# Patient Record
Sex: FEMALE | Race: WHITE | NOT HISPANIC OR LATINO
[De-identification: names, ages, dates, MRNs, and addresses within clinical notes are randomized per-mention and may not be internally consistent; named-entity substitution may affect disease eponyms.]

---

## 2022-01-25 PROBLEM — Z00.00 ENCOUNTER FOR PREVENTIVE HEALTH EXAMINATION: Status: ACTIVE | Noted: 2022-01-25

## 2022-01-27 ENCOUNTER — APPOINTMENT (OUTPATIENT)
Dept: NEUROLOGY | Facility: CLINIC | Age: 27
End: 2022-01-27
Payer: COMMERCIAL

## 2022-01-27 ENCOUNTER — NON-APPOINTMENT (OUTPATIENT)
Age: 27
End: 2022-01-27

## 2022-01-27 VITALS
TEMPERATURE: 98.3 F | SYSTOLIC BLOOD PRESSURE: 118 MMHG | DIASTOLIC BLOOD PRESSURE: 74 MMHG | HEIGHT: 68 IN | WEIGHT: 187 LBS | BODY MASS INDEX: 28.34 KG/M2 | HEART RATE: 66 BPM | OXYGEN SATURATION: 97 %

## 2022-01-27 DIAGNOSIS — R25.1 TREMOR, UNSPECIFIED: ICD-10-CM

## 2022-01-27 PROCEDURE — 99204 OFFICE O/P NEW MOD 45 MIN: CPT

## 2022-01-27 NOTE — PHYSICAL EXAM
[FreeTextEntry1] : Alert.  Fully oriented.  Speech and language are intact.  Cranial nerves II-XII are intact.  Motor exam reveals intact strength with individual muscle testing in bilateral upper and lower extremities.  Tone is normal.  Reflexes are normal.  Toes are downgoing.  Sensation is intact to light touch in distal extremities.  Finger-to-nose and heel-to-shin are intact. Slight intension/postural tremor with outstretched arms and FTN. No tremor at rest. Negative pull test. Normal rapid alternating movements are normal in the upper and lower extremities.  Gait is normal. She is able to walk on heels, toes, and in tandem without difficulty. Romberg negative. See scanned spiral/writing example.\par

## 2022-01-27 NOTE — HISTORY OF PRESENT ILLNESS
[FreeTextEntry1] : The patient is a very pleasant right-handed 26-year-old female with a history of Covid infection with full recovery in 2020 and hyperlipidemia, now better controlled who presents for evaluation of bilateral hand tremor.\par \par The patient states around 2 months ago, she developed tremor in both of her hands, the right worse than the left.  It seems to increase throughout the day.  It is not worsened or improved with caffeine but does worsen with alcohol and exercise.  She has not noticed any particular effect of eating food. She denies taking any medications apart from occasional Advil.  She notices most while typing, drinking, or doing goal-directed activities.  She has had ongoing fatigue and some dry mouth but denies any other systemic symptoms.  She has had a 60 pound intentional weight loss to improve diet and exercise.  She reports head trauma in the past with 2 prior concussions while playing sports.  She has occasional headaches but no history of migraines.  The patient denies substance use or tobacco use but does drink 4 glasses of wine at least 3-4 times per week.  She has a history of essential tremor in her grandfather but he was diagnosed at an older age.\par \par THe patient was seen by her PCP for the tremor. TSH, CMP, CBC, and Lipid panel were all unremarkable. B12 was 400. She has had no other studies.

## 2022-01-27 NOTE — ASSESSMENT
[FreeTextEntry1] : The patient is a very pleasant 26-year-old female who presents for bilateral hand tremor, right greater than left. Exam shows a relatively fine, low amplitude tremor with both postural and intention components. We will obtain basic labs and MRI brain to start. I will see her back to review symptomsm and will also place a movement disorder referral if needed.

## 2022-01-28 LAB
CRP SERPL-MCNC: <3 MG/L
ERYTHROCYTE [SEDIMENTATION RATE] IN BLOOD BY WESTERGREN METHOD: 2 MM/HR
FOLATE SERPL-MCNC: 13.4 NG/ML
T PALLIDUM AB SER QL IA: NEGATIVE
VIT B12 SERPL-MCNC: 513 PG/ML

## 2022-01-29 LAB
CERULOPLASMIN SERPL-MCNC: 24 MG/DL
ENA RNP AB SER IA-ACNC: 0.2 AL
ENA SM AB SER IA-ACNC: <0.2 AL

## 2022-01-31 LAB
HOMOCYSTEINE LEVEL: 10.3 UMOL/L
METHYLMALONIC ACID LEVEL: 97 NMOL/L

## 2022-02-02 LAB — ANA SER IF-ACNC: NEGATIVE

## 2022-02-04 ENCOUNTER — RESULT REVIEW (OUTPATIENT)
Age: 27
End: 2022-02-04

## 2022-02-04 ENCOUNTER — OUTPATIENT (OUTPATIENT)
Dept: OUTPATIENT SERVICES | Facility: HOSPITAL | Age: 27
LOS: 1 days | End: 2022-02-04

## 2022-02-04 ENCOUNTER — NON-APPOINTMENT (OUTPATIENT)
Age: 27
End: 2022-02-04

## 2022-02-04 ENCOUNTER — APPOINTMENT (OUTPATIENT)
Dept: MRI IMAGING | Facility: CLINIC | Age: 27
End: 2022-02-04
Payer: COMMERCIAL

## 2022-02-04 PROCEDURE — 70551 MRI BRAIN STEM W/O DYE: CPT | Mod: 26

## 2022-02-28 ENCOUNTER — APPOINTMENT (OUTPATIENT)
Dept: NEUROLOGY | Facility: CLINIC | Age: 27
End: 2022-02-28
Payer: COMMERCIAL

## 2022-02-28 ENCOUNTER — APPOINTMENT (OUTPATIENT)
Dept: NEUROLOGY | Facility: CLINIC | Age: 27
End: 2022-02-28

## 2022-02-28 VITALS
OXYGEN SATURATION: 97 % | DIASTOLIC BLOOD PRESSURE: 76 MMHG | TEMPERATURE: 97.2 F | HEIGHT: 68 IN | WEIGHT: 202 LBS | HEART RATE: 74 BPM | BODY MASS INDEX: 30.62 KG/M2 | SYSTOLIC BLOOD PRESSURE: 115 MMHG

## 2022-02-28 DIAGNOSIS — G47.00 INSOMNIA, UNSPECIFIED: ICD-10-CM

## 2022-02-28 DIAGNOSIS — G93.5 COMPRESSION OF BRAIN: ICD-10-CM

## 2022-02-28 PROCEDURE — 99214 OFFICE O/P EST MOD 30 MIN: CPT

## 2022-02-28 RX ORDER — CHOLECALCIFEROL (VITAMIN D3) 25 MCG
3 TABLET ORAL
Qty: 60 | Refills: 0 | Status: ACTIVE | COMMUNITY
Start: 2022-02-28 | End: 1900-01-01

## 2022-03-01 NOTE — HISTORY OF PRESENT ILLNESS
[FreeTextEntry1] : The patient is a very pleasant right-handed 26-year-old female with a history of Covid infection with full recovery in 2020 and hyperlipidemia, now better controlled who presents for f/u of bilateral hand tremor.\par \par From visit with Dr. Yarbrough in January- The patient states around 2 months ago, she developed tremor in both of her hands, the right worse than the left. It seems to increase throughout the day. It is not worsened or improved with caffeine but does worsen with alcohol and exercise. She has not noticed any particular effect of eating food. She denies taking any medications apart from occasional Advil. She notices most while typing, drinking, or doing goal-directed activities. She has had ongoing fatigue and some dry mouth but denies any other systemic symptoms. She has had a 60 pound intentional weight loss to improve diet and exercise. She reports head trauma in the past with 2 prior concussions while playing sports. She has occasional headaches but no history of migraines. The patient denies substance use or tobacco use but does drink 4 glasses of wine at least 3-4 times per week. She has a history of essential tremor in her grandfather but he was diagnosed at an older age. The patient was seen by her PCP for the tremor. TSH, CMP, CBC, and Lipid panel were all unremarkable. B12 was 400. She has had no other studies. \par ____________________________________\par \par 2/28\par MRI- chiari I with no acute or other findings to explain tremor \par \par Discussed tremors more at length today, She says her tremors initially began about 3 months ago. They appear to be getting worse since her visit with Dr. Yarbrough at the end of January. Says that she has been having more anxiety related to her tremors since that visit, which in turn is affecting her sleep. Says that most nights she wakes up at about 2am and tosses and turns the rest of the night. \par She endorses continued frequent ETOH use. She says she has 7-8 drinks per day on weekends, and then 1-2 drinks one weeknight a week. She says her tremors stop abruptly when she drinks alcohol. She denies hx of any withdrawal symptoms including palpitations/ nausea/ palpitations/ headaches/ mood changes/ seizures. She denies other recreational drug use. On the whole her tremors are worse during the week when she is not drinking as much. She does not take anything for anxiety. She does not take any medication for sleep. \par \par \par \par

## 2022-03-01 NOTE — ASSESSMENT
[FreeTextEntry1] : Pt here for f/u of b/l tremor. Initial workup including BW, MRI noncontributing although there is incidental finding of chiari I which should be monitored (will order repeat MRI to be done in 1 year). Af of now, her hx and exam are consistent with an essential tremor which are made worse by stress, lack of sleep, and large volume ETOH intake. Discussed limiting ETOH slowly and safely, can refer to ETOH cessation program as needed... Will also send rx for melatonin XR to help improve sleep. Discussed options for treatment including propanolol. She wishes to first improve sleep and ETOH habits and then discuss if these do not help... She has upcoming appt with movement d/o team for further eval.

## 2022-03-01 NOTE — PHYSICAL EXAM
[FreeTextEntry1] : The patient is alert and oriented x3, naming intact x3, repetition normal, follows three-step commands, and is able to participate fully in the history taking.\par Speech is normal with no evidence of dysarthria.\par Memory is intact: Immediate recall 3 out of 3, short-term 3 out of 3, remote memory intact\par Cranial nerves II through XII intact\par Motor exam: Upper and lower extremities 5 out of 5 power, normal tone. No tremor at rest. Fine tremor noted with purposeful movements on FNF, R>L. Mild tremor noted of tongue.  \par Sensory exam: Intact to light touch and pinprick. Romberg negative.\par Coordination and vestibular exam: Finger to nose intact, no evidence of truncal or appendicular ataxia. No evidence of nystagmus. no vestibular symptoms elicited with head turning during ambulation.\par Gait: Normal stance and gait.\par Reflexes: One to 2+ in upper and lower extremities. No pathological reflexes. Downgoing toes.\par

## 2022-04-29 ENCOUNTER — APPOINTMENT (OUTPATIENT)
Dept: NEUROLOGY | Facility: CLINIC | Age: 27
End: 2022-04-29

## 2022-08-26 ENCOUNTER — APPOINTMENT (OUTPATIENT)
Dept: NEUROLOGY | Facility: CLINIC | Age: 27
End: 2022-08-26

## 2022-08-26 VITALS
SYSTOLIC BLOOD PRESSURE: 126 MMHG | TEMPERATURE: 97.9 F | HEIGHT: 68 IN | DIASTOLIC BLOOD PRESSURE: 81 MMHG | HEART RATE: 70 BPM | WEIGHT: 194 LBS | BODY MASS INDEX: 29.4 KG/M2 | OXYGEN SATURATION: 98 %

## 2022-08-26 VITALS — SYSTOLIC BLOOD PRESSURE: 111 MMHG | HEART RATE: 52 BPM | OXYGEN SATURATION: 98 % | DIASTOLIC BLOOD PRESSURE: 76 MMHG

## 2022-08-26 PROCEDURE — 99215 OFFICE O/P EST HI 40 MIN: CPT

## 2022-08-29 NOTE — DISCUSSION/SUMMARY
[FreeTextEntry1] : Ms. Coats is a 28yo female presenting to movement clinic for bilateral hand tremors which started 2020-21 after the pandemic in the setting of binge drinking 8-12 drinks on the weekend, first in the right then progressive to the left. TSH, B12, and other serum tests WNL. Combination of exam with postural tremor along with clinical history of tremor starting after etoh use for 3years is highly suggestive of etoh induced tremor. Less likely to be essential tremor given lack of family hx and no improvement with etoh. Chiari malformation is likely an incidental finding but along with the drinking may have caused this tremor.\par \par - Discussed to remain off etoh as alcohol may be a causing or contributing factor to tremor.  \par - Cannot use propranolol given hx of asthma\par - Primidone is usually more effective in ET than etoh induced tremor. \par - Can try primidone 25mg nightly and titrate by half to BID then goal of 50mg BID\par \par \par Case discussed and pt examined with movement attending Dr. Collins\par \par Aden Mendez MD\par Movement Fellow

## 2022-08-29 NOTE — PHYSICAL EXAM
[FreeTextEntry1] : Movement Exam\par \par EOMI\par No rigidity of limbs, bradykinesia, or other parkinsonian signs\par No resting tremor\par instant tremor with hands outstretch, postural bilateral R>L slight\par No dysmetria with finger to nose\par Gait: able to stand with hands crossed and without assistance\par normal gait \par \par Hand spirals showed axis with left hand and less on right

## 2022-08-29 NOTE — HISTORY OF PRESENT ILLNESS
[FreeTextEntry1] : Ms. Coats is a 28yo female presenting to movement clinic for bilateral hand tremors which started 2020 first in the right then the left. Now progressively worse. She reports struggling placing jewelry and lifting a drink, typing, and work. Eating food is not affected. Present 70% of the day. Things that worsen the tremor include lack of sleep and being tired.  She used to evgeny drink on the weekend 8-12 drinks since the pandemic started 2-3 years ago. She has seen general neurology who recommended to wean off etoh which she has and been sober for 2-3 weeks. No improvement after drinking. some worsening day after a binge drink.  No tremor at rest. No family hx of tremor. no other medical conditions sparingly uses advil. Had childhood asthma. no supplements. Drinks 1 cup of coffee. If has 3-4cups can increase the tremor. \par \par Denies any trouble walking\par Sleep poor at times. 10pm till 6:30am. tosses and turns. no vivid dreams except if she eats excess dreams. denies talking or moving in sleep. melatonin has helped\par sense of smell good\par No constipation. twice a day bowel movement. \par penmanship has worsened worse with being tired. \par \par \par Construction desk job with typing\par no family hx of any neuro conditions\par \par No meds

## 2022-10-19 ENCOUNTER — APPOINTMENT (OUTPATIENT)
Dept: NEUROLOGY | Facility: CLINIC | Age: 27
End: 2022-10-19

## 2022-10-19 VITALS
TEMPERATURE: 97.2 F | HEART RATE: 67 BPM | OXYGEN SATURATION: 98 % | WEIGHT: 202 LBS | DIASTOLIC BLOOD PRESSURE: 85 MMHG | SYSTOLIC BLOOD PRESSURE: 131 MMHG | HEIGHT: 68 IN | BODY MASS INDEX: 30.62 KG/M2

## 2022-10-19 VITALS — DIASTOLIC BLOOD PRESSURE: 72 MMHG | OXYGEN SATURATION: 98 % | HEART RATE: 99 BPM | SYSTOLIC BLOOD PRESSURE: 109 MMHG

## 2022-10-19 PROCEDURE — 99214 OFFICE O/P EST MOD 30 MIN: CPT

## 2022-10-19 RX ORDER — PRIMIDONE 50 MG/1
50 TABLET ORAL
Qty: 90 | Refills: 2 | Status: DISCONTINUED | COMMUNITY
Start: 2022-08-26 | End: 2022-10-19

## 2023-10-27 ENCOUNTER — APPOINTMENT (OUTPATIENT)
Dept: NEUROLOGY | Facility: CLINIC | Age: 28
End: 2023-10-27
Payer: COMMERCIAL

## 2023-10-27 VITALS
HEIGHT: 68 IN | DIASTOLIC BLOOD PRESSURE: 73 MMHG | SYSTOLIC BLOOD PRESSURE: 112 MMHG | BODY MASS INDEX: 30.31 KG/M2 | WEIGHT: 200 LBS | TEMPERATURE: 98.4 F

## 2023-10-27 VITALS — SYSTOLIC BLOOD PRESSURE: 108 MMHG | HEART RATE: 73 BPM | DIASTOLIC BLOOD PRESSURE: 74 MMHG | OXYGEN SATURATION: 98 %

## 2023-10-27 PROCEDURE — 99213 OFFICE O/P EST LOW 20 MIN: CPT

## 2024-04-29 ENCOUNTER — EMERGENCY (EMERGENCY)
Facility: HOSPITAL | Age: 29
LOS: 1 days | Discharge: HOME | End: 2024-04-29
Admitting: STUDENT IN AN ORGANIZED HEALTH CARE EDUCATION/TRAINING PROGRAM
Payer: COMMERCIAL

## 2024-04-29 VITALS
HEIGHT: 69 IN | RESPIRATION RATE: 16 BRPM | DIASTOLIC BLOOD PRESSURE: 76 MMHG | HEART RATE: 75 BPM | WEIGHT: 190.04 LBS | SYSTOLIC BLOOD PRESSURE: 138 MMHG | TEMPERATURE: 98 F

## 2024-04-29 PROCEDURE — 90471 IMMUNIZATION ADMIN: CPT

## 2024-04-29 PROCEDURE — 99284 EMERGENCY DEPT VISIT MOD MDM: CPT

## 2024-04-29 PROCEDURE — 90715 TDAP VACCINE 7 YRS/> IM: CPT

## 2024-04-29 PROCEDURE — 99283 EMERGENCY DEPT VISIT LOW MDM: CPT | Mod: 25

## 2024-04-29 RX ORDER — TETANUS TOXOID, REDUCED DIPHTHERIA TOXOID AND ACELLULAR PERTUSSIS VACCINE, ADSORBED 5; 2.5; 8; 8; 2.5 [IU]/.5ML; [IU]/.5ML; UG/.5ML; UG/.5ML; UG/.5ML
0.5 SUSPENSION INTRAMUSCULAR ONCE
Refills: 0 | Status: COMPLETED | OUTPATIENT
Start: 2024-04-29 | End: 2024-04-29

## 2024-04-29 RX ADMIN — TETANUS TOXOID, REDUCED DIPHTHERIA TOXOID AND ACELLULAR PERTUSSIS VACCINE, ADSORBED 0.5 MILLILITER(S): 5; 2.5; 8; 8; 2.5 SUSPENSION INTRAMUSCULAR at 01:25

## 2024-04-29 RX ADMIN — Medication 1 TABLET(S): at 01:24

## 2024-04-29 NOTE — ED PROVIDER NOTE - PHYSICAL EXAMINATION
Constitutional : Well appearing, non-toxic, no acute distress. awake, alert, oriented to person, place, time/situation.  Head : head normocephalic, atraumatic  EENMT : eyes clear bilaterally, PERRL, EOMI. airway patent. moist mucous membranes. neck supple.  Cardiac : Normal rate, regular rhythm. No murmur appreciated, no LE edema.  Resp : Breath sounds clear and equal bilaterally. Respirations even and unlabored.   Gastro : abdomen soft, nontender, nondistended. no rebound or guarding. no CVAT.  MSK :  range of motion is not limited, no muscle or joint tenderness  Back : No evidence of trauma.   Vasc : Extremities warm and well perfused. 2+ radial and DP pulses. cap refill <2 seconds  Neuro : Alert and oriented, CNII-XII grossly intact, no motor or sensory deficits.  Skin : Skin normal color for race, warm, dry and intact. No evidence of rash.  Psych : Alert and oriented to person, place, time/situation. normal mood and affect. no apparent risk to self or others. L eyebrow - superficial linear laceration, <1cm, through eyebrow. no active bleeding. not gaping. nontender overlying.     Constitutional : non-toxic, no acute distress. awake, alert, oriented to person, place, time/situation.  Head : head normocephalic, atraumatic  EENMT : eyes clear bilaterally, PERRL. airway patent. neck supple.  Cardiac : Regular rate. Extremities warm and well perfused. 2+ radial and DP pulses. cap refill <2 seconds. no LE edema.  Resp : Respirations even and unlabored.   MSK :  range of motion is not limited. moving all extremities.  Back : No evidence of trauma.   Neuro : Alert and oriented, CNII-XII grossly intact, no motor or sensory deficits.  Skin : Skin normal color for race, warm, dry and intact. No evidence of rash.  Psych : Alert and oriented to person, place, time/situation. normal mood and affect. no apparent risk to self or others.

## 2024-04-29 NOTE — ED PROVIDER NOTE - NSFOLLOWUPINSTRUCTIONS_ED_ALL_ED_FT
Take augmentin 875 for 5 days. Take full course of antibiotics.  Your tetanus shot was updated.     Follow up with your Primary Care Doctor within one week.     Take tylenol as needed for pain - 650mg every 4-6 hours as needed for pain, do nto exceed 3000mg in 24 hours.     Return to the Emergency Department for persistent, worsening, or new symptoms including redness, warmth, or pus drainage from the site of the bite, redness traveling up your arm, any fever/chills, abdominal pain, nausea/vomiting, or any other serious concerns.

## 2024-04-29 NOTE — ED PROVIDER NOTE - OBJECTIVE STATEMENT
29 yr old female, denies medical hx, presents to the Emergency Department w dog bite.   friends dog, unk vaccine   provoked -   bite to L eyebrow. normal behavior. 29 yr old female, denies medical hx, presents to the Emergency Department w dog bite. pt bite by dog to L eyebrow just pta. friends dog that was adopted 3 weeks ago, unk dog vaccine status. dog was startled by male which is a known trigger for the dog. has otherwise exhibited normal behavior. unk last tetanus. no bleeding from wound.

## 2024-04-29 NOTE — ED PROVIDER NOTE - CLINICAL SUMMARY MEDICAL DECISION MAKING FREE TEXT BOX
pt bite by dog to L eyebrow just pta. friends dog that was adopted 3 weeks ago, unk dog vaccine status. dog was startled by male which is a known trigger for the dog. has otherwise exhibited normal behavior. unk last tetanus. no bleeding from wound.   pt nontoxic appearing, stable vitals, superficial linear laceration, <1cm, through left eyebrow. no active bleeding. not gaping. nontender overlying.   wound cleaned,   tetanus updated, dc on augmentin  no indication for closure - not gaping, superficial wound  to monitor dog behavior (friends dog) for s/sx concerning for rabies. no indication for vaccine / immunoglobin at this time    Discharge plan and return precautions d/w pt who verbalized understanding and agrees with plan. All questions answered. Vitals WNL. Ready for d/c.

## 2024-04-29 NOTE — ED PROVIDER NOTE - PATIENT PORTAL LINK FT
You can access the FollowMyHealth Patient Portal offered by [facility] by registering at the following website: [email or web address]. By joining [employer]’s FollowMyHealth portal, you will also be able to view your health information using other applications (apps) compatible with our system.

## 2024-04-29 NOTE — ED ADULT NURSE NOTE - OBJECTIVE STATEMENT
Pt reports "My friend's dog bit my L eyebrow ~2 hrs ago". Pt reports bleeding from lac x20 min, site dry at this time. Pt c/o pain, denies visual changes. Last tetanus ~10yr ago
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
## 2024-05-01 DIAGNOSIS — Z23 ENCOUNTER FOR IMMUNIZATION: ICD-10-CM

## 2024-05-01 DIAGNOSIS — S01.152A OPEN BITE OF LEFT EYELID AND PERIOCULAR AREA, INITIAL ENCOUNTER: ICD-10-CM
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
## 2024-05-01 DIAGNOSIS — S01.112A LACERATION WITHOUT FOREIGN BODY OF LEFT EYELID AND PERIOCULAR AREA, INITIAL ENCOUNTER: ICD-10-CM
